# Patient Record
Sex: MALE | Race: OTHER
[De-identification: names, ages, dates, MRNs, and addresses within clinical notes are randomized per-mention and may not be internally consistent; named-entity substitution may affect disease eponyms.]

---

## 2018-08-26 ENCOUNTER — HOSPITAL ENCOUNTER (EMERGENCY)
Dept: HOSPITAL 14 - H.ER | Age: 28
Discharge: HOME | End: 2018-08-26
Payer: SELF-PAY

## 2018-08-26 VITALS — SYSTOLIC BLOOD PRESSURE: 114 MMHG | HEART RATE: 105 BPM | DIASTOLIC BLOOD PRESSURE: 78 MMHG | RESPIRATION RATE: 20 BRPM

## 2018-08-26 VITALS — OXYGEN SATURATION: 99 %

## 2018-08-26 VITALS — TEMPERATURE: 98.2 F
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
## 2018-08-26 DIAGNOSIS — F10.129: Primary | ICD-10-CM

## 2018-08-26 DIAGNOSIS — S93.402A: ICD-10-CM
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
## 2018-08-26 DIAGNOSIS — R04.0: ICD-10-CM

## 2018-08-26 DIAGNOSIS — S00.81XA: ICD-10-CM

## 2018-08-26 PROCEDURE — 70450 CT HEAD/BRAIN W/O DYE: CPT

## 2018-08-26 PROCEDURE — 70486 CT MAXILLOFACIAL W/O DYE: CPT

## 2018-08-26 PROCEDURE — 73610 X-RAY EXAM OF ANKLE: CPT

## 2018-08-26 PROCEDURE — 99285 EMERGENCY DEPT VISIT HI MDM: CPT

## 2018-08-26 NOTE — ED PDOC
- ECG


O2 Sat by Pulse Oximetry: 99 (RA)


Pulse Ox Interpretation: Normal





- Progress


Re-evaluation Time: 07:52


Condition: Improved (Awake alert oriented x 3 no focal neuro deficits. C/o pain 

left ankle)





Medical Decision Making


Medical Decision Makin





Patient is transferred from Dr. Franks's care to myself pending sobriety.





--------------------------------------------------------------------------------

-----------------


Scribe Attestation:


Documented by Suzi Gastelum, acting as a scribe for Salbador Hansen MD.





Provider Scribe Attestation:


All medical record entries made by the Scribe were at my direction and 

personally dictated by me. I have reviewed the chart and agree that the record 

accurately reflects my personal performance of the history, physical exam, 

medical decision making, and the department course for this patient. I have 

also personally directed, reviewed, and agree with the discharge instructions 

and disposition.





Disposition





- Clinical Impression


Clinical Impression: 


 Alcohol abuse, Ankle sprain








- POA


Present On Arrival: None





- Disposition


Referrals: 


Podiatry Clinic [Outside]


Disposition: Routine/Home


Disposition Time: 07:53


Condition: FAIR


Prescriptions: 


Naproxen [Naprosyn] 500 mg PO Q12H #20 tab


Instructions:  Ankle Sprain, Alcohol Abuse and Alcoholism (DC)


Forms:  [facility] (English)

## 2018-08-26 NOTE — CT
Date of service: 



08/26/2018



PROCEDURE:  CT MAXILLOFACIAL BONES WITHOUT CONTRAST



HISTORY:

facial injury



COMPARISON:

None available.



TECHNIQUE:

Contiguous axial CT  images of the maxillofacial bones were obtained. 

Coronal and sagittal reformats were generated.



Radiation dose:



Total exam DLP =  888.75 mGy-cm.



This CT exam was performed using one or more of the following dose 

reduction techniques: Automated exposure control, adjustment of the 

mA and/or kV according to patient size, and/or use of iterative 

reconstruction technique.



FINDINGS:



NASAL BONES:

Unremarkable.



ORBITS:

Unremarkable.



PARANASAL SINUSES/ MASTOIDS:

Minor mucosal thickening seen in the left maxillary antrum. No fluid 

levels seen to suggest acute hemorrhage or sinusitis. 



MAXILLA:

Unremarkable.



MANDIBLE/ TEMPOROMANDIBULAR JOINTS:

Unremarkable.



SKULL BASE:

Unremarkable.



TEMPORAL BONES:

Middle ears and mastoid grossly unremarkable.



OTHER FINDINGS:

Bony orbits intact.  Globes intact and lenses appropriately located 

however note made of dysconjugate gaze.



IMPRESSION:

No evidence of acute maxillofacial skeletal fractures. .

## 2018-08-26 NOTE — RAD
Date of service: 



08/26/2018



PROCEDURE:  Left Ankle Radiographs.



HISTORY:

Pain  



COMPARISON:

None



FINDINGS:



BONES:

Normal. No fracture. 



JOINTS:

Normal. No osteoarthritis. Ankle mortise maintained. Talar dome intact



SOFT TISSUES:

Normal. 



OTHER FINDINGS:

None.



IMPRESSION:

Normal left ankle radiographs.

## 2018-08-26 NOTE — CT
Date of service: 



08/26/2018



PROCEDURE:  CT HEAD WITHOUT CONTRAST.



HISTORY:

head injury



COMPARISON:

Comparison made with concurrent CT scan of the maxillofacial skeleton.



TECHNIQUE:

Axial computed tomography images were obtained through the head/brain 

without intravenous contrast.  



Radiation dose:



Total exam DLP = 925.61 mGy-cm.



This CT exam was performed using one or more of the following dose 

reduction techniques: Automated exposure control, adjustment of the 

mA and/or kV according to patient size, and/or use of iterative 

reconstruction technique.



FINDINGS:



HEMORRHAGE:

No intracranial hemorrhage. 



BRAIN:

No mass effect or edema.  No atrophy or chronic microvascular 

ischemic changes.



VENTRICLES:

Unremarkable. No hydrocephalus. 



CALVARIUM:

Unremarkable.



PARANASAL SINUSES:

Unremarkable as visualized. No significant inflammatory changes.



MASTOID AIR CELLS:

Unremarkable as visualized. No inflammatory changes.



OTHER FINDINGS:

None.



IMPRESSION:

Normal CT of the Head.